# Patient Record
Sex: MALE | Race: WHITE | NOT HISPANIC OR LATINO | URBAN - METROPOLITAN AREA
[De-identification: names, ages, dates, MRNs, and addresses within clinical notes are randomized per-mention and may not be internally consistent; named-entity substitution may affect disease eponyms.]

---

## 2021-06-11 ENCOUNTER — EMERGENCY (EMERGENCY)
Facility: HOSPITAL | Age: 71
LOS: 1 days | Discharge: HOME | End: 2021-06-11
Attending: EMERGENCY MEDICINE | Admitting: EMERGENCY MEDICINE
Payer: MEDICARE

## 2021-06-11 VITALS
OXYGEN SATURATION: 97 % | SYSTOLIC BLOOD PRESSURE: 141 MMHG | HEART RATE: 75 BPM | DIASTOLIC BLOOD PRESSURE: 67 MMHG | RESPIRATION RATE: 17 BRPM

## 2021-06-11 VITALS
TEMPERATURE: 99 F | WEIGHT: 207.23 LBS | HEIGHT: 69 IN | DIASTOLIC BLOOD PRESSURE: 75 MMHG | OXYGEN SATURATION: 97 % | HEART RATE: 83 BPM | SYSTOLIC BLOOD PRESSURE: 169 MMHG | RESPIRATION RATE: 19 BRPM

## 2021-06-11 DIAGNOSIS — Z98.890 OTHER SPECIFIED POSTPROCEDURAL STATES: Chronic | ICD-10-CM

## 2021-06-11 DIAGNOSIS — I51.7 CARDIOMEGALY: ICD-10-CM

## 2021-06-11 DIAGNOSIS — J18.9 PNEUMONIA, UNSPECIFIED ORGANISM: ICD-10-CM

## 2021-06-11 DIAGNOSIS — R00.0 TACHYCARDIA, UNSPECIFIED: ICD-10-CM

## 2021-06-11 DIAGNOSIS — Z98.890 OTHER SPECIFIED POSTPROCEDURAL STATES: ICD-10-CM

## 2021-06-11 DIAGNOSIS — Z88.5 ALLERGY STATUS TO NARCOTIC AGENT: ICD-10-CM

## 2021-06-11 DIAGNOSIS — J45.909 UNSPECIFIED ASTHMA, UNCOMPLICATED: ICD-10-CM

## 2021-06-11 DIAGNOSIS — Z20.822 CONTACT WITH AND (SUSPECTED) EXPOSURE TO COVID-19: ICD-10-CM

## 2021-06-11 DIAGNOSIS — E78.5 HYPERLIPIDEMIA, UNSPECIFIED: ICD-10-CM

## 2021-06-11 DIAGNOSIS — R05 COUGH: ICD-10-CM

## 2021-06-11 DIAGNOSIS — I77.0 ARTERIOVENOUS FISTULA, ACQUIRED: ICD-10-CM

## 2021-06-11 DIAGNOSIS — I77.0 ARTERIOVENOUS FISTULA, ACQUIRED: Chronic | ICD-10-CM

## 2021-06-11 DIAGNOSIS — I12.0 HYPERTENSIVE CHRONIC KIDNEY DISEASE WITH STAGE 5 CHRONIC KIDNEY DISEASE OR END STAGE RENAL DISEASE: ICD-10-CM

## 2021-06-11 DIAGNOSIS — Z99.2 DEPENDENCE ON RENAL DIALYSIS: ICD-10-CM

## 2021-06-11 DIAGNOSIS — N18.6 END STAGE RENAL DISEASE: ICD-10-CM

## 2021-06-11 LAB
ANION GAP SERPL CALC-SCNC: 11 MMOL/L — SIGNIFICANT CHANGE UP (ref 5–17)
BASOPHILS # BLD AUTO: 0.04 K/UL — SIGNIFICANT CHANGE UP (ref 0–0.2)
BASOPHILS NFR BLD AUTO: 0.7 % — SIGNIFICANT CHANGE UP (ref 0–2)
BUN SERPL-MCNC: 17 MG/DL — SIGNIFICANT CHANGE UP (ref 7–23)
CALCIUM SERPL-MCNC: 9.6 MG/DL — SIGNIFICANT CHANGE UP (ref 8.4–10.5)
CHLORIDE SERPL-SCNC: 93 MMOL/L — LOW (ref 96–108)
CO2 SERPL-SCNC: 33 MMOL/L — HIGH (ref 22–31)
CREAT SERPL-MCNC: 6.66 MG/DL — HIGH (ref 0.5–1.3)
EOSINOPHIL # BLD AUTO: 0.13 K/UL — SIGNIFICANT CHANGE UP (ref 0–0.5)
EOSINOPHIL NFR BLD AUTO: 2.2 % — SIGNIFICANT CHANGE UP (ref 0–6)
GLUCOSE SERPL-MCNC: 91 MG/DL — SIGNIFICANT CHANGE UP (ref 70–99)
HCT VFR BLD CALC: 28.9 % — LOW (ref 39–50)
HGB BLD-MCNC: 9.1 G/DL — LOW (ref 13–17)
IMM GRANULOCYTES NFR BLD AUTO: 0.3 % — SIGNIFICANT CHANGE UP (ref 0–1.5)
LYMPHOCYTES # BLD AUTO: 1.05 K/UL — SIGNIFICANT CHANGE UP (ref 1–3.3)
LYMPHOCYTES # BLD AUTO: 18.1 % — SIGNIFICANT CHANGE UP (ref 13–44)
MCHC RBC-ENTMCNC: 29.6 PG — SIGNIFICANT CHANGE UP (ref 27–34)
MCHC RBC-ENTMCNC: 31.5 GM/DL — LOW (ref 32–36)
MCV RBC AUTO: 94.1 FL — SIGNIFICANT CHANGE UP (ref 80–100)
MONOCYTES # BLD AUTO: 0.53 K/UL — SIGNIFICANT CHANGE UP (ref 0–0.9)
MONOCYTES NFR BLD AUTO: 9.1 % — SIGNIFICANT CHANGE UP (ref 2–14)
NEUTROPHILS # BLD AUTO: 4.04 K/UL — SIGNIFICANT CHANGE UP (ref 1.8–7.4)
NEUTROPHILS NFR BLD AUTO: 69.6 % — SIGNIFICANT CHANGE UP (ref 43–77)
NRBC # BLD: 0 /100 WBCS — SIGNIFICANT CHANGE UP (ref 0–0)
PLATELET # BLD AUTO: 131 K/UL — LOW (ref 150–400)
POTASSIUM SERPL-MCNC: 4.7 MMOL/L — SIGNIFICANT CHANGE UP (ref 3.5–5.3)
POTASSIUM SERPL-SCNC: 4.7 MMOL/L — SIGNIFICANT CHANGE UP (ref 3.5–5.3)
RBC # BLD: 3.07 M/UL — LOW (ref 4.2–5.8)
RBC # FLD: 13.9 % — SIGNIFICANT CHANGE UP (ref 10.3–14.5)
SARS-COV-2 RNA SPEC QL NAA+PROBE: NEGATIVE — SIGNIFICANT CHANGE UP
SODIUM SERPL-SCNC: 137 MMOL/L — SIGNIFICANT CHANGE UP (ref 135–145)
WBC # BLD: 5.81 K/UL — SIGNIFICANT CHANGE UP (ref 3.8–10.5)
WBC # FLD AUTO: 5.81 K/UL — SIGNIFICANT CHANGE UP (ref 3.8–10.5)

## 2021-06-11 PROCEDURE — 87635 SARS-COV-2 COVID-19 AMP PRB: CPT

## 2021-06-11 PROCEDURE — 80048 BASIC METABOLIC PNL TOTAL CA: CPT

## 2021-06-11 PROCEDURE — 71045 X-RAY EXAM CHEST 1 VIEW: CPT | Mod: 26

## 2021-06-11 PROCEDURE — 99283 EMERGENCY DEPT VISIT LOW MDM: CPT | Mod: 25

## 2021-06-11 PROCEDURE — 94640 AIRWAY INHALATION TREATMENT: CPT

## 2021-06-11 PROCEDURE — 71045 X-RAY EXAM CHEST 1 VIEW: CPT

## 2021-06-11 PROCEDURE — 99284 EMERGENCY DEPT VISIT MOD MDM: CPT | Mod: CS

## 2021-06-11 PROCEDURE — 36415 COLL VENOUS BLD VENIPUNCTURE: CPT

## 2021-06-11 PROCEDURE — 85025 COMPLETE CBC W/AUTO DIFF WBC: CPT

## 2021-06-11 RX ORDER — ALBUTEROL 90 UG/1
2.5 AEROSOL, METERED ORAL ONCE
Refills: 0 | Status: COMPLETED | OUTPATIENT
Start: 2021-06-11 | End: 2021-06-11

## 2021-06-11 RX ORDER — ALBUTEROL 90 UG/1
2 AEROSOL, METERED ORAL
Qty: 1 | Refills: 0
Start: 2021-06-11

## 2021-06-11 RX ORDER — AZITHROMYCIN 500 MG/1
1 TABLET, FILM COATED ORAL
Qty: 4 | Refills: 0
Start: 2021-06-11 | End: 2021-06-14

## 2021-06-11 RX ORDER — AZITHROMYCIN 500 MG/1
500 TABLET, FILM COATED ORAL ONCE
Refills: 0 | Status: COMPLETED | OUTPATIENT
Start: 2021-06-11 | End: 2021-06-11

## 2021-06-11 RX ORDER — CEFPODOXIME PROXETIL 100 MG
1 TABLET ORAL
Qty: 3 | Refills: 0
Start: 2021-06-11

## 2021-06-11 RX ORDER — CARVEDILOL PHOSPHATE 80 MG/1
25 CAPSULE, EXTENDED RELEASE ORAL ONCE
Refills: 0 | Status: COMPLETED | OUTPATIENT
Start: 2021-06-11 | End: 2021-06-11

## 2021-06-11 RX ADMIN — AZITHROMYCIN 500 MILLIGRAM(S): 500 TABLET, FILM COATED ORAL at 21:56

## 2021-06-11 RX ADMIN — Medication 100 MILLIGRAM(S): at 21:13

## 2021-06-11 RX ADMIN — ALBUTEROL 2.5 MILLIGRAM(S): 90 AEROSOL, METERED ORAL at 21:13

## 2021-06-11 RX ADMIN — CARVEDILOL PHOSPHATE 25 MILLIGRAM(S): 80 CAPSULE, EXTENDED RELEASE ORAL at 22:12

## 2021-06-11 NOTE — ED PROVIDER NOTE - PROGRESS NOTE DETAILS
Pt feeling better.  CXR w ?able RLL patchy infiltrate.  Labs wnl.  Will treat for poss cap (renally dosing azithro/cefpodoxime).  To [facility] pmd.

## 2021-06-11 NOTE — ED PROVIDER NOTE - CONSTITUTIONAL, MLM
normal... Well appearing, awake, alert, oriented to person, place, time/situation and in no apparent distress. no resp distress, + cough

## 2021-06-11 NOTE — ED PROVIDER NOTE - PATIENT PORTAL LINK FT
You can access the FollowMyHealth Patient Portal offered by [facility] by registering at the following website: [email or web address]. By joining [employer]’s FollowMyHealth portal, you will also be able to view your health information using other applications (apps) compatible with our system.

## 2021-06-11 NOTE — ED ADULT NURSE REASSESSMENT NOTE - NS ED NURSE REASSESS COMMENT FT1
Pt reports increased respiratory effort post Albuterol Neb. Clear lung sounds auscultated bilaterally. To administer pt's normal daily meds per MD Director. Carvedilol 25mg.

## 2021-06-11 NOTE — ED PROVIDER NOTE - CLINICAL SUMMARY MEDICAL DECISION MAKING FREE TEXT BOX
Pt c/o cough w clear/white sputum since this afternoon, reports same 2/2 asthma, no sig wheezing on exam.  Afebrile, nl sat.  Low concern for pna, no sig uri type sx w cough.  S/p covid vaccine - low concern covid.  Plan labs, neb, cough meds, cxr; reassess.

## 2021-06-11 NOTE — ED ADULT TRIAGE NOTE - OTHER COMPLAINTS
CC of cough x today, HD pt TThs finished his last session, mild SOB but coughing non-stop since lunch time.

## 2021-06-11 NOTE — ED ADULT NURSE NOTE - PMH
Cardiomegaly    ESRD (end stage renal disease) on dialysis    High cholesterol    HTN (hypertension)     Asthma    Cardiomegaly    ESRD (end stage renal disease) on dialysis    High cholesterol    HTN (hypertension)

## 2021-06-11 NOTE — ED ADULT NURSE NOTE - OBJECTIVE STATEMENT
71y Male A&oX3 C/O cough and sob x1 day. PT reports coughing up white sticky phlegm. Pt Has a hx of HTN, High cholesterol, ESRD. HD on Tuesday, Thursday, and Friday. Has not missed a HD and compliant with all medications. Pt seen at [facility] on Friday for bright red Rectal bleeding. Pt reports bleeding stopped that same night and d/emili from [facility]. Pt has a Endoscopy appointment on July 2nd. Denies any current bleeding. abd pain, chest pain, n/v, fever, chills, nor night sweats. Pt has had 2 doses of Pfizer Vaccine 71y Male A&oX3 C/O cough and sob x1 day. PT reports coughing up white sticky phlegm. Pt Has a hx of "Mild Asthma" HTN, High cholesterol, ESRD. HD on Tuesday, Thursday, and Friday. Has not missed a HD and compliant with all medications. Pt seen at [facility] on Friday for bright red Rectal bleeding. Pt reports bleeding stopped that same night and d/emili from [facility]. Pt has a Endoscopy appointment on July 2nd. Denies any current bleeding. abd pain, chest pain, n/v, fever, chills, nor night sweats. Pt has had 2 doses of Pfizer Vaccine. Bilateral trace lower ex edema noted. Per pt "I unusually have leg swelling" before HD.

## 2021-06-11 NOTE — ED PROVIDER NOTE - NSFOLLOWUPINSTRUCTIONS_ED_ALL_ED_FT
Pneumonia    Take azithromycin 1 tab once a day for 4 more days.  Take cefpodoxime 1 tab AFTER dialysis on tomorrow as well as this coming Tues and Thurs.  Use albuterol 2 puffs every 4-6 hours as needed for cough/shortness of breath.   Take Tessalon Perles 1 tab every 8 hours as needed for cough.   Return for increased pain, difficulty breathing, palpitations, fever, any other concerns.     Follow up with your pmd    Pneumonia    Pneumonia is an infection of the lungs. Pneumonia may be caused by bacteria, viruses, or funguses. Symptoms include coughing, fever, chest pain when breathing deeply or coughing, shortness of breath, fatigue, or muscle aches. Pneumonia can be diagnosed with a medical history and physical exam, as well as other tests which may include a chest X-ray. If you were prescribed an antibiotic medicine, take it as told by your health care provider and do not stop taking the antibiotic even if you start to feel better. Do not use tobacco products, including cigarettes, chewing tobacco, and e-cigarettes.    SEEK IMMEDIATE MEDICAL CARE IF YOU HAVE ANY OF THE FOLLOWING SYMPTOMS: worsening shortness of breath, worsening chest pain, coughing up blood, change in mental status, lightheadedness/dizziness.

## 2021-06-11 NOTE — ED PROVIDER NOTE - PMH
Cardiomegaly    ESRD (end stage renal disease) on dialysis    High cholesterol    HTN (hypertension)

## 2021-06-11 NOTE — ED ADULT NURSE NOTE - NSIMPLEMENTINTERV_GEN_ALL_ED
Implemented All Fall Risk Interventions:  [place] to call system. Call bell, personal items and telephone within reach. Instruct patient to call for assistance. Room bathroom lighting operational. Non-slip footwear when patient is off stretcher. Physically safe environment: no spills, clutter or unnecessary equipment. Stretcher in lowest position, wheels locked, appropriate side rails in place. Provide visual cue, wrist band, yellow gown, etc. Monitor gait and stability. Monitor for mental status changes and reorient to person, place, and time. Review medications for side effects contributing to fall risk. Reinforce activity limits and safety measures with patient and family.

## 2021-06-11 NOTE — ED PROVIDER NOTE - OBJECTIVE STATEMENT
70 yo male h/o ESRD (t/th/sat), HTN, HLD, umbilical hernia, cardiomegaly 70 yo male h/o ESRD (t/th/sat), HTN, HLD, umbilical hernia, cardiomegaly, mild asthma/rad c/o incessant coughing since 12p today w white/clear sputum w/o associated cp, palpitations, sob, uri sx, le edema/pain, fever, sick contacts, travel.  Pt reports he usually gets relief w mdi but did not have one.  Pt denies tob.  No abd pain, n/v/d.  S/p covid vaccination. Pt states similar sx in past 2/2 asthma and/or bronchitis. 70 yo male h/o ESRD (t/th/sat), HTN, HLD, umbilical hernia, cardiomegaly, mild asthma/rad c/o incessant coughing since 12p today w white/clear sputum w/o associated cp, palpitations, sob, uri sx, le edema/pain, fever, sick contacts, travel.  Pt reports he usually gets relief w mdi but did not have one.  Pt denies tob.  No abd pain, n/v/d.  S/p covid vaccination. Pt states similar sx in past 2/2 asthma and/or bronchitis. Pt reports  he had a neg covid test on Mon this week during eval at another hospital for brbpr (resolved).

## 2023-04-25 ENCOUNTER — EMERGENCY (EMERGENCY)
Facility: HOSPITAL | Age: 73
LOS: 1 days | Discharge: HOME | End: 2023-04-25
Attending: EMERGENCY MEDICINE | Admitting: EMERGENCY MEDICINE
Payer: MEDICARE

## 2023-04-25 VITALS
WEIGHT: 192.9 LBS | HEART RATE: 74 BPM | HEIGHT: 69 IN | TEMPERATURE: 98 F | DIASTOLIC BLOOD PRESSURE: 69 MMHG | SYSTOLIC BLOOD PRESSURE: 122 MMHG | RESPIRATION RATE: 20 BRPM | OXYGEN SATURATION: 96 %

## 2023-04-25 VITALS
SYSTOLIC BLOOD PRESSURE: 126 MMHG | TEMPERATURE: 99 F | RESPIRATION RATE: 20 BRPM | HEART RATE: 72 BPM | DIASTOLIC BLOOD PRESSURE: 64 MMHG | OXYGEN SATURATION: 95 %

## 2023-04-25 DIAGNOSIS — Z87.09 PERSONAL HISTORY OF OTHER DISEASES OF THE RESPIRATORY SYSTEM: ICD-10-CM

## 2023-04-25 DIAGNOSIS — R05.1 ACUTE COUGH: ICD-10-CM

## 2023-04-25 DIAGNOSIS — I77.0 ARTERIOVENOUS FISTULA, ACQUIRED: Chronic | ICD-10-CM

## 2023-04-25 DIAGNOSIS — Z88.5 ALLERGY STATUS TO NARCOTIC AGENT: ICD-10-CM

## 2023-04-25 DIAGNOSIS — N18.6 END STAGE RENAL DISEASE: ICD-10-CM

## 2023-04-25 DIAGNOSIS — Z87.19 PERSONAL HISTORY OF OTHER DISEASES OF THE DIGESTIVE SYSTEM: ICD-10-CM

## 2023-04-25 DIAGNOSIS — Z88.1 ALLERGY STATUS TO OTHER ANTIBIOTIC AGENTS STATUS: ICD-10-CM

## 2023-04-25 DIAGNOSIS — Z20.822 CONTACT WITH AND (SUSPECTED) EXPOSURE TO COVID-19: ICD-10-CM

## 2023-04-25 DIAGNOSIS — Z86.79 PERSONAL HISTORY OF OTHER DISEASES OF THE CIRCULATORY SYSTEM: ICD-10-CM

## 2023-04-25 DIAGNOSIS — Z99.2 DEPENDENCE ON RENAL DIALYSIS: ICD-10-CM

## 2023-04-25 DIAGNOSIS — I12.0 HYPERTENSIVE CHRONIC KIDNEY DISEASE WITH STAGE 5 CHRONIC KIDNEY DISEASE OR END STAGE RENAL DISEASE: ICD-10-CM

## 2023-04-25 DIAGNOSIS — J45.909 UNSPECIFIED ASTHMA, UNCOMPLICATED: ICD-10-CM

## 2023-04-25 DIAGNOSIS — Z98.890 OTHER SPECIFIED POSTPROCEDURAL STATES: Chronic | ICD-10-CM

## 2023-04-25 DIAGNOSIS — E78.00 PURE HYPERCHOLESTEROLEMIA, UNSPECIFIED: ICD-10-CM

## 2023-04-25 LAB
ANION GAP SERPL CALC-SCNC: 10 MMOL/L — SIGNIFICANT CHANGE UP (ref 5–17)
BASE EXCESS BLDV CALC-SCNC: 6 MMOL/L — HIGH (ref -2–3)
BUN SERPL-MCNC: 22 MG/DL — SIGNIFICANT CHANGE UP (ref 7–23)
CA-I SERPL-SCNC: 1.2 MMOL/L — SIGNIFICANT CHANGE UP (ref 1.15–1.33)
CALCIUM SERPL-MCNC: 9.7 MG/DL — SIGNIFICANT CHANGE UP (ref 8.4–10.5)
CHLORIDE SERPL-SCNC: 94 MMOL/L — LOW (ref 96–108)
CO2 BLDV-SCNC: 34.7 MMOL/L — HIGH (ref 22–26)
CO2 SERPL-SCNC: 31 MMOL/L — SIGNIFICANT CHANGE UP (ref 22–31)
CREAT SERPL-MCNC: 6.75 MG/DL — HIGH (ref 0.5–1.3)
EGFR: 8 ML/MIN/1.73M2 — LOW
FLUAV AG NPH QL: SIGNIFICANT CHANGE UP
FLUBV AG NPH QL: SIGNIFICANT CHANGE UP
GAS PNL BLDV: 135 MMOL/L — LOW (ref 136–145)
GAS PNL BLDV: SIGNIFICANT CHANGE UP
GAS PNL BLDV: SIGNIFICANT CHANGE UP
GLUCOSE SERPL-MCNC: 88 MG/DL — SIGNIFICANT CHANGE UP (ref 70–99)
HCO3 BLDV-SCNC: 33 MMOL/L — HIGH (ref 22–29)
PCO2 BLDV: 57 MMHG — HIGH (ref 42–55)
PH BLDV: 7.37 — SIGNIFICANT CHANGE UP (ref 7.32–7.43)
PO2 BLDV: 46 MMHG — HIGH (ref 25–45)
POTASSIUM BLDV-SCNC: 4.6 MMOL/L — SIGNIFICANT CHANGE UP (ref 3.5–5.1)
POTASSIUM SERPL-MCNC: 4.5 MMOL/L — SIGNIFICANT CHANGE UP (ref 3.5–5.3)
POTASSIUM SERPL-SCNC: 4.5 MMOL/L — SIGNIFICANT CHANGE UP (ref 3.5–5.3)
RSV RNA NPH QL NAA+NON-PROBE: SIGNIFICANT CHANGE UP
SAO2 % BLDV: 78.3 % — SIGNIFICANT CHANGE UP (ref 67–88)
SARS-COV-2 RNA SPEC QL NAA+PROBE: SIGNIFICANT CHANGE UP
SODIUM SERPL-SCNC: 135 MMOL/L — SIGNIFICANT CHANGE UP (ref 135–145)

## 2023-04-25 PROCEDURE — 99285 EMERGENCY DEPT VISIT HI MDM: CPT

## 2023-04-25 PROCEDURE — 71046 X-RAY EXAM CHEST 2 VIEWS: CPT | Mod: 26

## 2023-04-25 NOTE — ED ADULT NURSE NOTE - OBJECTIVE STATEMENT
Pt a+ox4 c/o productive cough x 3 days. Dialysis pt tu/th/sa with fistula to L upper arm. Received treatment today

## 2023-04-25 NOTE — ED ADULT TRIAGE NOTE - CHIEF COMPLAINT QUOTE
pt c/o cough (+green phlegm), difficulty taking deep breaths, beginning saturday. dialysis pt, t, th, sat, port to left arm.

## 2023-04-26 PROBLEM — J45.909 UNSPECIFIED ASTHMA, UNCOMPLICATED: Chronic | Status: ACTIVE | Noted: 2021-06-11

## 2023-04-26 LAB
BASOPHILS # BLD AUTO: 0.05 K/UL — SIGNIFICANT CHANGE UP (ref 0–0.2)
BASOPHILS NFR BLD AUTO: 0.9 % — SIGNIFICANT CHANGE UP (ref 0–2)
EOSINOPHIL # BLD AUTO: 0.21 K/UL — SIGNIFICANT CHANGE UP (ref 0–0.5)
EOSINOPHIL NFR BLD AUTO: 3.7 % — SIGNIFICANT CHANGE UP (ref 0–6)
HCT VFR BLD CALC: 35.1 % — LOW (ref 39–50)
HGB BLD-MCNC: 10.8 G/DL — LOW (ref 13–17)
IMM GRANULOCYTES NFR BLD AUTO: 0.9 % — SIGNIFICANT CHANGE UP (ref 0–0.9)
LYMPHOCYTES # BLD AUTO: 1.27 K/UL — SIGNIFICANT CHANGE UP (ref 1–3.3)
LYMPHOCYTES # BLD AUTO: 22.1 % — SIGNIFICANT CHANGE UP (ref 13–44)
MCHC RBC-ENTMCNC: 29.8 PG — SIGNIFICANT CHANGE UP (ref 27–34)
MCHC RBC-ENTMCNC: 30.8 GM/DL — LOW (ref 32–36)
MCV RBC AUTO: 96.7 FL — SIGNIFICANT CHANGE UP (ref 80–100)
MONOCYTES # BLD AUTO: 0.59 K/UL — SIGNIFICANT CHANGE UP (ref 0–0.9)
MONOCYTES NFR BLD AUTO: 10.3 % — SIGNIFICANT CHANGE UP (ref 2–14)
NEUTROPHILS # BLD AUTO: 3.57 K/UL — SIGNIFICANT CHANGE UP (ref 1.8–7.4)
NEUTROPHILS NFR BLD AUTO: 62.1 % — SIGNIFICANT CHANGE UP (ref 43–77)
NRBC # BLD: 0 /100 WBCS — SIGNIFICANT CHANGE UP (ref 0–0)
PLATELET # BLD AUTO: 88 K/UL — LOW (ref 150–400)
RBC # BLD: 3.63 M/UL — LOW (ref 4.2–5.8)
RBC # FLD: 14.9 % — HIGH (ref 10.3–14.5)
WBC # BLD: 5.74 K/UL — SIGNIFICANT CHANGE UP (ref 3.8–10.5)
WBC # FLD AUTO: 5.74 K/UL — SIGNIFICANT CHANGE UP (ref 3.8–10.5)

## 2023-04-26 PROCEDURE — 36415 COLL VENOUS BLD VENIPUNCTURE: CPT

## 2023-04-26 PROCEDURE — 82330 ASSAY OF CALCIUM: CPT

## 2023-04-26 PROCEDURE — 71046 X-RAY EXAM CHEST 2 VIEWS: CPT

## 2023-04-26 PROCEDURE — 82803 BLOOD GASES ANY COMBINATION: CPT

## 2023-04-26 PROCEDURE — 84132 ASSAY OF SERUM POTASSIUM: CPT

## 2023-04-26 PROCEDURE — 94640 AIRWAY INHALATION TREATMENT: CPT

## 2023-04-26 PROCEDURE — 80048 BASIC METABOLIC PNL TOTAL CA: CPT

## 2023-04-26 PROCEDURE — 84295 ASSAY OF SERUM SODIUM: CPT

## 2023-04-26 PROCEDURE — 87637 SARSCOV2&INF A&B&RSV AMP PRB: CPT

## 2023-04-26 PROCEDURE — 99285 EMERGENCY DEPT VISIT HI MDM: CPT | Mod: 25

## 2023-04-26 PROCEDURE — 85025 COMPLETE CBC W/AUTO DIFF WBC: CPT

## 2023-04-26 RX ORDER — ALBUTEROL 90 UG/1
2 AEROSOL, METERED ORAL
Qty: 1 | Refills: 0
Start: 2023-04-26

## 2023-04-26 RX ORDER — IPRATROPIUM/ALBUTEROL SULFATE 18-103MCG
3 AEROSOL WITH ADAPTER (GRAM) INHALATION
Refills: 0 | Status: COMPLETED | OUTPATIENT
Start: 2023-04-26 | End: 2023-04-26

## 2023-04-26 RX ADMIN — Medication 3 MILLILITER(S): at 00:29

## 2023-04-26 RX ADMIN — Medication 3 MILLILITER(S): at 00:52

## 2023-04-26 RX ADMIN — Medication 3 MILLILITER(S): at 00:36

## 2023-04-26 NOTE — ED PROVIDER NOTE - CLINICAL SUMMARY MEDICAL DECISION MAKING FREE TEXT BOX
history of ESRD (t/th/sat - no missed sessions), HTN, HLD, umbilical hernia, cardiomegaly, mild asthma/rad here w cough x 1 day. productive cough, feels like previous episodes of bronchitis. no chest pain, sob.   pt well appearing - +coughing on exam, stable vitals - not tachypneic or hypoxic, exam unremarkable - lungs clear, no LE edema  possible asthma / rad exacerbation, viral illness, r/o pna  do not suspect PE - no sob / hypoxia  plan - labs, ecg, cxr  trial nebs, steroids  reassess

## 2023-04-26 NOTE — ED PROVIDER NOTE - CARE PROVIDER_API CALL
Celsa Molina)  Critical Care Medicine; Pulmonary Disease  [address] [place] [address]  Phone: [phone]  Fax: [phone]  Follow Up Time:

## 2023-04-26 NOTE — ED PROVIDER NOTE - NS ED ATTENDING STATEMENT MOD
This was a shared visit with the IZA. I reviewed and verified the documentation and independently performed the documented:

## 2023-04-26 NOTE — ED PROVIDER NOTE - PROGRESS NOTE DETAILS
labs ok. cxr w/o new findings.   pt feeling better after nebs. lungs clear.   will dc w tessalon pearls and albuterol inhaler w short course steroids. ok for dc and outpt follow up  given pulm information. labs ok. cxr w/o new findings.   pt feeling better after nebs. lungs clear.   will dc w tessalon pearls and albuterol inhaler w short course steroids. ok for dc and outpt follow up  given pulm information.    All results reviewed with the patient verbally. Discharge plan and return precautions d/w pt who verbalized understanding and agrees with plan. All questions answered. Vitals WNL. Ready for d/c.

## 2023-04-26 NOTE — ED PROVIDER NOTE - NSICDXPASTMEDICALHX_GEN_ALL_CORE_FT
PAST MEDICAL HISTORY:  Asthma     Cardiomegaly     ESRD (end stage renal disease) on dialysis     High cholesterol     HTN (hypertension)

## 2023-04-26 NOTE — ED PROVIDER NOTE - PATIENT PORTAL LINK FT
You can access the FollowMyHealth Patient Portal offered by [facility] by registering at the following website: [email or web address]. By joining [employer]’s FollowMyHealth portal, you will also be able to view your health information using other applications (apps) compatible with our system.

## 2023-04-26 NOTE — ED PROVIDER NOTE - NSFOLLOWUPINSTRUCTIONS_ED_ALL_ED_FT
Drink plenty of fluids and get plenty of rest.   Take mucinex dm as needed for cough  Take prednisone for 4 days as prescribed   Use albuterol inhaler as prescribed.     Follow up with your Primary Care Doctor within one week.   You should also follow up with a Pulmonologist - see information listed here.     Return to the Emergency Department for persistent, worsening or new symptoms including high fever, chest pain, shortness of breath, persistent nausea/vomiting, abdominal pain, persistent nausea/vomiting and inability to keep down liquids, or any other serious concerns.

## 2023-04-26 NOTE — ED PROVIDER NOTE - OBJECTIVE STATEMENT
73 yr old male, history of ESRD (t/th/sat), HTN, HLD, umbilical hernia, cardiomegaly, mild asthma/rad 73 yr old male, history of ESRD (t/th/sat), HTN, HLD, umbilical hernia, cardiomegaly, mild asthma/rad presents to the Emergency Department with cough. pt w cough x 1 day. productive cough - yellow sputum. coughing fits that bothered him throughout the day. no sob - notes that he gets sob right before he is due for dialysis but not feeling that now. no missed dialysis.   gets bronchitis every few years - most recently 1.5 yrs ago - this feels similar. normally improves w nebs and steroids.  no fever, chest pain, SOB, abd pain, n/v, urinary symptoms, leg swelling.

## 2023-04-26 NOTE — ED PROVIDER NOTE - PHYSICAL EXAMINATION
Constitutional : Well appearing, non-toxic, no acute distress. awake, alert, oriented to person, place, time/situation.  Head : head normocephalic, atraumatic  EENMT : eyes clear bilaterally, PERRL, EOMI. airway patent. moist mucous membranes. neck supple.  Cardiac : Normal rate, regular rhythm. No murmur appreciated, no LE edema.  Resp : Breath sounds clear and equal bilaterally. Respirations even and unlabored.   Gastro : abdomen soft, nontender, nondistended. no rebound or guarding.   MSK :  range of motion is not limited, no muscle or joint tenderness  Back : No evidence of trauma. No spinal or CVA tenderness.  Vasc : LUE fistula - good thrill. Extremities warm and well perfused. 2+ radial and DP pulses. cap refill <2 seconds  Neuro : Alert and oriented, CNII-XII grossly intact, no focal deficits, no motor or sensory deficits.  Skin : Skin normal color for race, warm, dry and intact. No evidence of rash.  Psych : Alert and oriented to person, place, time/situation. normal mood and affect. no apparent risk to self or others.

## 2023-05-31 ENCOUNTER — APPOINTMENT (OUTPATIENT)
Dept: PULMONOLOGY | Facility: CLINIC | Age: 73
End: 2023-05-31
Payer: MEDICARE

## 2023-05-31 ENCOUNTER — NON-APPOINTMENT (OUTPATIENT)
Age: 73
End: 2023-05-31

## 2023-05-31 VITALS
TEMPERATURE: 36.8 F | BODY MASS INDEX: 26.66 KG/M2 | DIASTOLIC BLOOD PRESSURE: 76 MMHG | WEIGHT: 180 LBS | SYSTOLIC BLOOD PRESSURE: 147 MMHG | HEART RATE: 76 BPM | HEIGHT: 69 IN | OXYGEN SATURATION: 95 %

## 2023-05-31 DIAGNOSIS — J92.0 PLEURAL PLAQUE WITH PRESENCE OF ASBESTOS: ICD-10-CM

## 2023-05-31 DIAGNOSIS — N18.6 END STAGE RENAL DISEASE: ICD-10-CM

## 2023-05-31 DIAGNOSIS — Z77.118 CONTACT WITH AND (SUSPECTED) EXPOSURE TO OTHER ENVIRONMENTAL POLLUTION: ICD-10-CM

## 2023-05-31 DIAGNOSIS — I51.7 CARDIOMEGALY: ICD-10-CM

## 2023-05-31 DIAGNOSIS — Z99.2 END STAGE RENAL DISEASE: ICD-10-CM

## 2023-05-31 DIAGNOSIS — J44.9 CHRONIC OBSTRUCTIVE PULMONARY DISEASE, UNSPECIFIED: ICD-10-CM

## 2023-05-31 DIAGNOSIS — E78.5 HYPERLIPIDEMIA, UNSPECIFIED: ICD-10-CM

## 2023-05-31 DIAGNOSIS — J45.909 UNSPECIFIED ASTHMA, UNCOMPLICATED: ICD-10-CM

## 2023-05-31 DIAGNOSIS — R93.89 ABNORMAL FINDINGS ON DIAGNOSTIC IMAGING OF OTHER SPECIFIED BODY STRUCTURES: ICD-10-CM

## 2023-05-31 DIAGNOSIS — I47.1 SUPRAVENTRICULAR TACHYCARDIA: ICD-10-CM

## 2023-05-31 DIAGNOSIS — R06.83 SNORING: ICD-10-CM

## 2023-05-31 DIAGNOSIS — I10 ESSENTIAL (PRIMARY) HYPERTENSION: ICD-10-CM

## 2023-05-31 DIAGNOSIS — Z87.891 PERSONAL HISTORY OF NICOTINE DEPENDENCE: ICD-10-CM

## 2023-05-31 PROCEDURE — 99204 OFFICE O/P NEW MOD 45 MIN: CPT

## 2023-05-31 RX ORDER — CARVEDILOL 3.12 MG/1
TABLET, FILM COATED ORAL
Refills: 0 | Status: ACTIVE | COMMUNITY

## 2023-05-31 RX ORDER — ALBUTEROL SULFATE 90 UG/1
108 (90 BASE) AEROSOL, METERED RESPIRATORY (INHALATION)
Refills: 0 | Status: ACTIVE | COMMUNITY

## 2023-05-31 RX ORDER — ALBUTEROL SULFATE 2.5 MG/3ML
(2.5 MG/3ML) SOLUTION RESPIRATORY (INHALATION)
Refills: 0 | Status: ACTIVE | COMMUNITY

## 2023-05-31 RX ORDER — ATORVASTATIN CALCIUM 80 MG/1
TABLET, FILM COATED ORAL
Refills: 0 | Status: ACTIVE | COMMUNITY

## 2023-05-31 RX ORDER — AMLODIPINE BESYLATE 10 MG/1
10 TABLET ORAL
Refills: 0 | Status: ACTIVE | COMMUNITY
Start: 2023-05-31

## 2023-05-31 RX ORDER — CINACALCET 60 MG/1
60 TABLET ORAL
Refills: 0 | Status: ACTIVE | COMMUNITY
Start: 2023-05-31

## 2023-05-31 NOTE — HISTORY OF PRESENT ILLNESS
[Former] : former [Never] : never [TextBox_4] : 73 yr old male with PMH of \par \par Present today to establish care post ER visit.  he is complaining of problem breathing in the hot and cold weather.  He is complaining of cough like his asthma.  He had the cough for the last 9 years and is intermittent.  It restarts with sinus infection, bronchitis, change of weather with extreme heat and cold.  The cough then gets productive with green sputum then gets started on antibiotics [ESS] : 0

## 2023-05-31 NOTE — PROCEDURE
[FreeTextEntry1] : INTERPRETATION: XR CHEST PA AND LATERAL dated 4/25/2023 11:23 PM\par \par CLINICAL INFORMATION: Male, 73 years old. cough.\par \par PRIOR STUDIES: 6/11/2021\par \par FINDINGS: Mild cardiomegaly. Mediastinal and hilar contours are unchanged. There may be mild pulmonary venous congestion. Prominence of the hilar vessels which may represent pulmonary artery dilatation with peripheral rapid tapering. No consolidation or pleural collections. Linear calcific opacity which may represent a calcified mural plaque involving the ascending aorta unchanged from prior imaging. No acute soft tissue or osseous abnormalities. There may be calcified pleural plaques involving the left midlung zone and diaphragmatic pleural interface is. Possibility of asbestos related pleural disease cannot be excluded.\par \par IMPRESSION:\par \par 1. Persistent cardiomegaly. No consolidation or pleural effusions.\par 2. Prominence of the pulmonary arteries with rapid peripheral tapering which may suggest pulmonary artery hypertension. Clinical and echocardiographic correlation.\par 3. Calcified pleural plaques. Abbreviated differential includes asbestos related pleural disease. Further imaging to include HRCT to exclude asbestosis.

## 2023-05-31 NOTE — ASSESSMENT
[FreeTextEntry1] : Bronchial asthma/COPD\par \par The patient is followed at [facility].  The patient was prescribed Atrovent albuterol nebulizer and MDI as needed basis during the areas of flareup.  Patient is not on any maintenance therapy even during the episode of flareup.  We will try to get the obtains of previous PFTs and records from the other facility\par \par Chest x-ray was reviewed from the ER and consistent with previous asbestos exposure with pleural calcified pleural plaques and prominence interstitial pattern with dilated pulmonary artery.  The patient could be consistent with fluid overload or underlying restrictive lung disease from asbestos exposure.  At this time we will continue on the current regimen until we get previous records and we will follow-up on PFT.  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  We will follow-up with the PFT.  CT scan of the chest to further evaluate the lung parenchyma.\par \par Follow-up on the echocardiogram to evaluate for the cardiomegaly and pulmonary pressures.  The patient stated that he did not does not have underlying cardiac ischemic disease

## 2023-05-31 NOTE — REVIEW OF SYSTEMS
[Cough] : cough [Wheezing] : wheezing [SOB on Exertion] : sob on exertion [Negative] : Endocrine [Hemoptysis] : no hemoptysis [Chest Tightness] : no chest tightness [Frequent URIs] : no frequent URIs [Sputum] : no sputum [Dyspnea] : no dyspnea [Pleuritic Pain] : no pleuritic pain [A.M. Dry Mouth] : no a.m. dry mouth

## 2023-06-02 ENCOUNTER — APPOINTMENT (OUTPATIENT)
Dept: PULMONOLOGY | Facility: CLINIC | Age: 73
End: 2023-06-02

## 2023-07-31 ENCOUNTER — APPOINTMENT (OUTPATIENT)
Dept: SLEEP CENTER | Facility: HOME HEALTH | Age: 73
End: 2023-07-31

## 2023-08-01 ENCOUNTER — EMERGENCY (EMERGENCY)
Facility: HOSPITAL | Age: 73
LOS: 1 days | Discharge: HOME | End: 2023-08-01
Attending: STUDENT IN AN ORGANIZED HEALTH CARE EDUCATION/TRAINING PROGRAM | Admitting: STUDENT IN AN ORGANIZED HEALTH CARE EDUCATION/TRAINING PROGRAM
Payer: MEDICARE

## 2023-08-01 VITALS
HEIGHT: 69 IN | HEART RATE: 69 BPM | DIASTOLIC BLOOD PRESSURE: 78 MMHG | RESPIRATION RATE: 18 BRPM | SYSTOLIC BLOOD PRESSURE: 147 MMHG | TEMPERATURE: 98 F | OXYGEN SATURATION: 96 % | WEIGHT: 188.72 LBS

## 2023-08-01 VITALS
OXYGEN SATURATION: 99 % | RESPIRATION RATE: 18 BRPM | SYSTOLIC BLOOD PRESSURE: 177 MMHG | TEMPERATURE: 98 F | DIASTOLIC BLOOD PRESSURE: 81 MMHG | HEART RATE: 66 BPM

## 2023-08-01 DIAGNOSIS — I77.0 ARTERIOVENOUS FISTULA, ACQUIRED: Chronic | ICD-10-CM

## 2023-08-01 DIAGNOSIS — Z98.890 OTHER SPECIFIED POSTPROCEDURAL STATES: Chronic | ICD-10-CM

## 2023-08-01 PROCEDURE — 99283 EMERGENCY DEPT VISIT LOW MDM: CPT

## 2023-08-01 PROCEDURE — 99284 EMERGENCY DEPT VISIT MOD MDM: CPT

## 2023-08-01 RX ORDER — METHOCARBAMOL 500 MG/1
1 TABLET, FILM COATED ORAL
Qty: 9 | Refills: 0
Start: 2023-08-01 | End: 2023-08-03

## 2023-08-01 RX ORDER — METHOCARBAMOL 500 MG/1
1000 TABLET, FILM COATED ORAL ONCE
Refills: 0 | Status: COMPLETED | OUTPATIENT
Start: 2023-08-01 | End: 2023-08-01

## 2023-08-01 RX ORDER — LIDOCAINE 4 G/100G
1 CREAM TOPICAL ONCE
Refills: 0 | Status: COMPLETED | OUTPATIENT
Start: 2023-08-01 | End: 2023-08-01

## 2023-08-01 RX ORDER — LIDOCAINE 4 G/100G
1 CREAM TOPICAL
Qty: 7 | Refills: 0
Start: 2023-08-01 | End: 2023-08-07

## 2023-08-01 RX ADMIN — METHOCARBAMOL 1000 MILLIGRAM(S): 500 TABLET, FILM COATED ORAL at 23:13

## 2023-08-01 RX ADMIN — LIDOCAINE 1 PATCH: 4 CREAM TOPICAL at 23:13

## 2023-08-01 NOTE — ED PROVIDER NOTE - CLINICAL SUMMARY MEDICAL DECISION MAKING FREE TEXT BOX
Pt clinically well appearing, consistent w/ muscle spasm/strain  pt w/ localized tenderness over the trapezius/SCM area b/l but L>R  no concern for acute emergent pathology  no other complaints  improved w/ lido patch and robaxin  No further indication for emergent or inpatient workup, pt stable and ready for discharge. Results, diagnosis and post-discharge plan including appropriate outpatient follow up were discussed and all questions answered

## 2023-08-01 NOTE — ED ADULT NURSE NOTE - NSFALLUNIVINTERV_ED_ALL_ED
Bed/Stretcher in lowest position, wheels locked, appropriate side rails in place/Call bell, personal items and telephone in reach/Instruct patient to call for assistance before getting out of bed/chair/stretcher/Non-slip footwear applied when patient is off stretcher/[place] to call system/Physically safe environment - no spills, clutter or unnecessary equipment/Purposeful proactive rounding/Room/bathroom lighting operational, light cord in reach

## 2023-08-01 NOTE — ED ADULT NURSE NOTE - OBJECTIVE STATEMENT
73yoM PMH kidney disease on dialysis Tues, Thurs, Sat came to ED c/o L neck pain that spread to his jaw. Pt states that the pain started today after dialysis. Pt denies chest pain, SOB, n/v/d. EKG performed.

## 2023-08-01 NOTE — ED PROVIDER NOTE - PATIENT PORTAL LINK FT
You can access the FollowMyHealth Patient Portal offered by [facility] by registering at the following website: [email or web address]. By joining [employer]’s FollowMyHealth portal, you will also be able to view your health information using other applications (apps) compatible with our system.

## 2023-08-01 NOTE — ED PROVIDER NOTE - NSFOLLOWUPINSTRUCTIONS_ED_ALL_ED_FT
Muscle Cramps and Spasms    Muscle cramps and spasms are when muscles tighten by themselves. They usually get better within minutes. Muscle cramps are painful. They are usually stronger and last longer than muscle spasms. Muscle spasms may or may not be painful. They can last a few seconds or much longer.    Cramps and spasms can affect any muscle, but they occur most often in the calf muscles of the leg. They are usually not caused by a serious problem. In many cases, the cause is not known. Some common causes include:  Doing more physical work or exercise than your body is ready for.  Using the muscles too much (overuse) by repeating certain movements too many times.  Staying in a certain position for a long time.  Playing a sport or doing an activity without preparing properly.  Using bad form or technique while playing a sport or doing an activity.  Not having enough water in your body (dehydration).  Injury.  Side effects of some medicines.  Low levels of the salts and minerals in your blood (electrolytes), such as low potassium or calcium.  Follow these instructions at home:  Managing pain and stiffness    A heating pad for use on the affected area.  Bag of ice on a towel on the skin.  Massage, stretch, and relax the muscle. Do this for many minutes at a time.  If told, put heat on tight or tense muscles as often as told by your doctor. Use the heat source that your doctor recommends, such as a moist heat pack or a heating pad.  Place a towel between your skin and the heat source.  Leave the heat on for 20–30 minutes.  Remove the heat if your skin turns bright red. This is very important if you are not able to feel pain, heat, or cold. You may have a greater risk of getting burned.  If told, put ice on the affected area. This may help if you are sore or have pain after a cramp or spasm.  Put ice in a plastic bag.  Place a towel between your skin and the bag.  Leave the ice on for 20 minutes, 2–3 times a day.  Try taking hot showers or baths to help relax tight muscles.  Eating and drinking    Drink enough fluid to keep your pee (urine) pale yellow.  Eat a healthy diet to help ensure that your muscles work well. This should include:  Fruits and vegetables.  Lean protein.  Whole grains.  Low-fat or nonfat dairy products.  General instructions    If you are having cramps often, avoid intense exercise for several days.  Take over-the-counter and prescription medicines only as told by your doctor.  Watch for any changes in your symptoms.  Keep all follow-up visits as told by your doctor. This is important.  Contact a doctor if:  Your cramps or spasms get worse or happen more often.  Your cramps or spasms do not get better with time.  Summary  Muscle cramps and spasms are when muscles tighten by themselves. They usually get better within minutes.  Cramps and spasms occur most often in the calf muscles of the leg.  Massage, stretch, and relax the muscle. This may help the cramp or spasm go away.  Drink enough fluid to keep your pee (urine) pale yellow.  This information is not intended to replace advice given to you by your health care provider. Make sure you discuss any questions you have with your health care provider.    Document Revised: 07/08/2022 Document Reviewed: 07/08/2022  Elsevier Patient Education © 2023 [employer] Inc[employer]

## 2023-08-01 NOTE — ED PROVIDER NOTE - PHYSICAL EXAMINATION
CONST: nontoxic NAD speaking in full sentences  HEAD: atraumatic  EYES: conjunctivae clear  NECK: supple, FROM, no midline ttp, no masses. +tightness and tenderness to b/l trapezius area  CARD: regular rate  CHEST: breathing comfortably, no stridor/retractions/tripoding  EXT: FROM  SKIN: warm, dry, +palpable fistula L side  NEURO: awake alert answering questions following commands moving all extremities

## 2023-08-01 NOTE — ED PROVIDER NOTE - OBJECTIVE STATEMENT
73yM w/ HTN, HLD, ESRD on HD T/Th/S comes in for evaluation of neck pain. pt says after dialysis he was bent over and felt pain in his b/l neck/back of shoulder. Also feels like it was swollen on his L neck. Tender when he presses it or moves. Says he thinks it is a muscle and it feels tight.

## 2023-08-05 DIAGNOSIS — Z99.2 DEPENDENCE ON RENAL DIALYSIS: ICD-10-CM

## 2023-08-05 DIAGNOSIS — Z88.1 ALLERGY STATUS TO OTHER ANTIBIOTIC AGENTS STATUS: ICD-10-CM

## 2023-08-05 DIAGNOSIS — Z88.8 ALLERGY STATUS TO OTHER DRUGS, MEDICAMENTS AND BIOLOGICAL SUBSTANCES: ICD-10-CM

## 2023-08-05 DIAGNOSIS — Z79.02 LONG TERM (CURRENT) USE OF ANTITHROMBOTICS/ANTIPLATELETS: ICD-10-CM

## 2023-08-05 DIAGNOSIS — N18.6 END STAGE RENAL DISEASE: ICD-10-CM

## 2023-08-05 DIAGNOSIS — I12.0 HYPERTENSIVE CHRONIC KIDNEY DISEASE WITH STAGE 5 CHRONIC KIDNEY DISEASE OR END STAGE RENAL DISEASE: ICD-10-CM

## 2023-08-05 DIAGNOSIS — M54.2 CERVICALGIA: ICD-10-CM

## 2023-08-05 DIAGNOSIS — M62.830 MUSCLE SPASM OF BACK: ICD-10-CM

## 2023-08-31 ENCOUNTER — APPOINTMENT (OUTPATIENT)
Dept: PULMONOLOGY | Facility: CLINIC | Age: 73
End: 2023-08-31

## 2023-10-02 NOTE — ED ADULT TRIAGE NOTE - NS ED NURSE DIRECT TO ROOM YN
You can access the FollowMyHealth Patient Portal offered by [facility] by registering at the following website: [email or web address]. By joining [employer]’s FollowMyHealth portal, you will also be able to view your health information using other applications (apps) compatible with our system.
No

## 2023-10-09 NOTE — ED ADULT NURSE NOTE - CAS EDP DISCH TYPE
Home Itraconazole Counseling:  I discussed with the patient the risks of itraconazole including but not limited to liver damage, nausea/vomiting, neuropathy, and severe allergy.  The patient understands that this medication is best absorbed when taken with acidic beverages such as non-diet cola or ginger ale.  The patient understands that monitoring is required including baseline LFTs and repeat LFTs at intervals.  The patient understands that they are to contact us or the primary physician if concerning signs are noted.
